# Patient Record
Sex: MALE | Race: WHITE | NOT HISPANIC OR LATINO | Employment: OTHER | URBAN - METROPOLITAN AREA
[De-identification: names, ages, dates, MRNs, and addresses within clinical notes are randomized per-mention and may not be internally consistent; named-entity substitution may affect disease eponyms.]

---

## 2022-01-31 ENCOUNTER — SPECIALTY PHARMACY (OUTPATIENT)
Dept: PHARMACY | Facility: CLINIC | Age: 67
End: 2022-01-31

## 2022-02-04 ENCOUNTER — SPECIALTY PHARMACY (OUTPATIENT)
Dept: PHARMACY | Facility: CLINIC | Age: 67
End: 2022-02-04

## 2022-02-04 NOTE — TELEPHONE ENCOUNTER
Specialty Pharmacy - Initial Clinical Assessment    Specialty Medication Orders Linked to Encounter    Flowsheet Row Most Recent Value   Medication #1 adalimumab 80 mg/0.8 mL-40 mg/0.4 mL PnKt (Order#[id number], Rx#[id number])   Medication #2 adalimumab (HUMIRA,CF, PEN) 40 mg/0.4 mL PnKt (Order#[id number], Rx#[id number])        Subjective    Nawaf Correa Jr. is a 66 y.o. male, who is followed by the specialty pharmacy service for management and education.    Recent Encounters     Date Type Provider Description    02/04/2022 Specialty Pharmacy Pooja Saeed PharmD Initial Clinical Assessment    01/31/2022 Specialty Pharmacy Pooja Saeed PharmD Referral Authorization        Clinical call attempts since last clinical assessment   No call attempts found.     Today he received education before his first fill with [facility].    Current Outpatient Medications   Medication Sig    adalimumab 80 mg/0.8 mL-40 mg/0.4 mL PnKt Inject 80 mg pen into the skin on day 1, then inject 40 mg into the skin every 14 days starting on day 8    adalimumab (HUMIRA,CF, PEN) 40 mg/0.4 mL PnKt Starting day 8 inject 40mg SC every 2 wks    bisoprolol-hydrochlorothiazide 5-6.25 mg (ZIAC) 5-6.25 mg Tab Take 1 tablet by mouth once daily.    desoximetasone (TOPICORT) 0.25 % cream APPLY TO THE AFFECTED AREA(S) a SMALL AMOUNT AS DIRECTED    desoximetasone (TOPICORT) 0.25 % cream once a day as needed    diltiaZEM (CARDIZEM CD) 300 MG 24 hr capsule Take 300 mg by mouth once daily.    glimepiride (AMARYL) 2 MG tablet Take 2 mg by mouth before breakfast.    indomethacin (INDOCIN) 50 MG capsule Take 50 mg by mouth 2 (two) times daily with meals.    lisinopriL (PRINIVIL,ZESTRIL) 2.5 MG tablet Take 2.5 mg by mouth once daily.    metFORMIN (GLUCOPHAGE) 500 MG tablet Take 500 mg by mouth 2 (two) times daily with meals.    montelukast (SINGULAIR) 10 mg tablet Take 10 mg by mouth once daily.    rosuvastatin (CRESTOR) 10 MG tablet  Take 10 mg by mouth once daily.    triamcinolone acetonide 0.5% (KENALOG) 0.5 % Crea aaa bid x 2-3 wks, take 1 wk off and repeat for psoriasis   Last reviewed on 1/10/2022 10:43 AM by Zoraida Velez MA    Review of patient's allergies indicates:  No Known AllergiesLast reviewed on  1/31/2022 11:18 AM by Victor Manuel Wolf    Drug Interactions    Drug interactions evaluated: yes  Clinically relevant drug interactions identified: no  Provided the patient with educational material regarding drug interactions: not applicable         Adverse Effects    Pruritus: Pos  Rash: Pos       Assessment Questions - Documented Responses    Flowsheet Row Most Recent Value   Assessment    Medication Reconciliation completed for patient Yes   During the past 4 weeks, has patient missed any activities due to condition or medication? No   During the past 4 weeks, did patient have any of the following urgent care visits? None   Goals of Therapy Status Discussed (new start)   Welcome packet contents reviewed and discussed with patient? Yes   Assesment completed? Yes   Plan Therapy being initiated   Do you need to open a clinical intervention (i-vent)? No   Do you want to schedule first shipment? Yes   Medication #1 Assessment Info    Patient status New medication, New to [facility]   Is this medication appropriate for the patient? Yes   Is this medication effective? Not yet started        Refill Questions - Documented Responses    Flowsheet Row Most Recent Value   Patient Availability and HIPAA Verification    Does patient want to proceed with activity? Yes   HIPAA/medical authority confirmed? Yes   Relationship to patient of person spoken to? Self   Refill Screening Questions    When does the patient need to receive the medication? 02/08/22   Refill Delivery Questions    How will the patient receive the medication? Mail   When does the patient need to receive the medication? 02/08/22   Shipping Address Home   Address in [facility]  confirmed and updated if neccessary? Yes   Expected Copay ($) 9.85   Is the patient able to afford the medication copay? Yes   Payment Method invoice (approval required)   Days supply of Refill 28   Supplies needed? No supplies needed   Refill activity completed? Yes   Refill activity plan Refill scheduled   Shipment/Pickup Date: 02/07/22          Objective    He has a past medical history of Cholesterol calculus of gallbladder and High blood pressure.    BP Readings from Last 4 Encounters:   No data found for BP     Ht Readings from Last 4 Encounters:   No data found for Ht     Wt Readings from Last 4 Encounters:   01/10/22 122.6 kg (270 lb 2.8 oz)     Recent Labs   Lab Result Units 01/10/22  1107   Creatinine mg/dL 1.1   ALT U/L 15   AST U/L 14   Hepatitis B Surface Ag  Negative     The goals of Psoriasis treatment include:  · Reducing the size, thickness and extent of lesions and erythema  · Relieving the symptoms of psoriasis  · Improving and maintaining physical function  · Preventing infection and other complications of treatment  · Reducing long term complications of psoriasis  · Minimizing psychological impact on overall well-being  · Improving or maintaining quality of life  · Maintaining optimal therapy adherence  · Minimizing and managing side effects    Goals of Therapy Status: Discussed (new start)    Assessment/Plan  Patient plans to start therapy on 02/08/22      Indication, dosage, appropriateness, effectiveness, safety and convenience of his specialty medication(s) were reviewed today.     Patient Counseling    Counseled the patient on the following: doses and administration discussed, safe handling, storage, and disposal discussed, possible adverse effects and management discussed, possible drug and prescription drug interactions discussed, possible drug and OTC drug and food interactions discussed, lab monitoring and follow-up discussed, therapeutic rationale discussed, cost of medications and  cost implications discussed, adherence and missed doses discussed, pharmacy contact information discussed       Humira initial consult. Pt plans on starting Humira on 2/8 as soon as he receives it. Pt declined Humira nurse ambassador number. Pt's son has bene on Humira for years and will be present for pt's 1st dose to help if needed. Pt feels comfortable with self-injections. Pt's psoriasis is severe and is located on his face, scalp, back, chest, abdomen, legs, and buttocks. Medication list was verified against Epic and is current. Pt is eager to start due to severity of his psoriasis. He feels comfortable with self-injections.   Infection precautions reviewed with pt. Counseled pt to hold dose if ill. Dosage, storage, and administration reviewed with pt. Informed pt Humira is stable at room temperature for 14 days. How to handle missed doses reviewed. Advised pt to use a calendar to keep track of injection days. Injection sites reviewed with pt, as well as rotating injection sites. Injection training reviewed with pt. Pt voiced understanding. Common side effects reviewed with pt. Serious side effects to report to MD reviewed with pt. Warnings/precautions discussed - avoiding live vaccines, lupus-like syndrome, malignancy/lymphoma, risk of serious infection, signs/symptoms of liver abnormalities. Reiterated importance of keeping lab appts. Pt verbalized understanding. [facility] refill process and services explained to pt. [facility] will call in 3 weeks for refill. Pt had no further questions or concerns and was encouraged to call [facility] should any arise.  Tasks added this encounter   3/1/2022 - Refill Call (Auto Added)  4/28/2022 - Clinical - Follow Up Assesement (90 day)   Tasks due within next 3 months   No tasks due.     Pooja Saeed, PharmD  [facility]  [address] [facility] [address]  Phone: [phone]  Fax: [phone]

## 2022-03-03 ENCOUNTER — SPECIALTY PHARMACY (OUTPATIENT)
Dept: PHARMACY | Facility: CLINIC | Age: 67
End: 2022-03-03

## 2022-03-03 NOTE — TELEPHONE ENCOUNTER
Specialty Pharmacy - Refill Coordination    Specialty Medication Orders Linked to Encounter    Flowsheet Row Most Recent Value   Medication #1 adalimumab (HUMIRA,CF, PEN) 40 mg/0.4 mL PnKt (Order#[id number], Rx#[id number])          Refill Questions - Documented Responses    Flowsheet Row Most Recent Value   Patient Availability and HIPAA Verification    Does patient want to proceed with activity? Yes   HIPAA/medical authority confirmed? Yes   Relationship to patient of person spoken to? Self   Refill Screening Questions    Changes to allergies? No   Changes to medications? No   New conditions since last clinic visit? No   Unplanned office visit, urgent care, ED, or hospital admission in the last 4 weeks? No   How does patient/caregiver feel medication is working? Very good   Financial problems or insurance changes? No   How many doses of your specialty medications were missed in the last 4 weeks? 0   Would patient like to speak to a pharmacist? No   When does the patient need to receive the medication? 03/15/22   Refill Delivery Questions    How will the patient receive the medication? Mail   When does the patient need to receive the medication? 03/15/22   Shipping Address Home   Address in [facility] confirmed and updated if neccessary? Yes   Expected Copay ($) 0   Is the patient able to afford the medication copay? Yes   Payment Method zero copay   Days supply of Refill 28   Supplies needed? No supplies needed   Refill activity completed? Yes   Refill activity plan Refill scheduled   Shipment/Pickup Date: 03/08/22          Current Outpatient Medications   Medication Sig    adalimumab (HUMIRA,CF, PEN) 40 mg/0.4 mL PnKt Inject 0.4 mLs (40 mg total) into the skin every 14 (fourteen) days.    bisoprolol-hydrochlorothiazide 5-6.25 mg (ZIAC) 5-6.25 mg Tab Take 1 tablet by mouth once daily.    desoximetasone (TOPICORT) 0.25 % cream APPLY TO THE AFFECTED AREA(S) a SMALL AMOUNT AS DIRECTED    desoximetasone  (TOPICORT) 0.25 % cream once a day as needed    diltiaZEM (CARDIZEM CD) 300 MG 24 hr capsule Take 300 mg by mouth once daily.    glimepiride (AMARYL) 2 MG tablet Take 2 mg by mouth before breakfast.    indomethacin (INDOCIN) 50 MG capsule Take 50 mg by mouth 2 (two) times daily with meals.    lisinopriL (PRINIVIL,ZESTRIL) 2.5 MG tablet Take 2.5 mg by mouth once daily.    metFORMIN (GLUCOPHAGE) 500 MG tablet Take 500 mg by mouth 2 (two) times daily with meals.    montelukast (SINGULAIR) 10 mg tablet Take 10 mg by mouth once daily.    rosuvastatin (CRESTOR) 10 MG tablet Take 10 mg by mouth once daily.    triamcinolone acetonide 0.5% (KENALOG) 0.5 % Crea aaa bid x 2-3 wks, take 1 wk off and repeat for psoriasis   Last reviewed on 1/10/2022 10:43 AM by Zoraida Vleez MA    Review of patient's allergies indicates:  No Known Allergies Last reviewed on  1/31/2022 11:18 AM by Victor Manuel Wolf      Tasks added this encounter   4/5/2022 - Refill Call (Auto Added)   Tasks due within next 3 months   4/28/2022 - Clinical - Follow Up Assesement (90 day)     Rolly Ponce, PharmD  [facility]  [address] [place] [address]  Phone: [phone]  Fax: [phone]

## 2022-04-05 ENCOUNTER — SPECIALTY PHARMACY (OUTPATIENT)
Dept: PHARMACY | Facility: CLINIC | Age: 67
End: 2022-04-05

## 2022-04-05 NOTE — TELEPHONE ENCOUNTER
Specialty Pharmacy - Refill Coordination    Specialty Medication Orders Linked to Encounter    Flowsheet Row Most Recent Value   Medication #1 adalimumab (HUMIRA,CF, PEN) 40 mg/0.4 mL PnKt (Order#[id number], Rx#[id number])          Refill Questions - Documented Responses    Flowsheet Row Most Recent Value   Patient Availability and HIPAA Verification    Does patient want to proceed with activity? Yes   HIPAA/medical authority confirmed? Yes   Relationship to patient of person spoken to? Self   Refill Screening Questions    Changes to allergies? No   Changes to medications? No   New conditions since last clinic visit? No   Unplanned office visit, urgent care, ED, or hospital admission in the last 4 weeks? No   How does patient/caregiver feel medication is working? Very good   Financial problems or insurance changes? No   How many doses of your specialty medications were missed in the last 4 weeks? 0   Would patient like to speak to a pharmacist? No   When does the patient need to receive the medication? 04/12/22   Refill Delivery Questions    How will the patient receive the medication? Mail   When does the patient need to receive the medication? 04/12/22   Shipping Address Home   Address in [facility] confirmed and updated if neccessary? Yes   Expected Copay ($) 0   Is the patient able to afford the medication copay? Yes   Payment Method zero copay   Days supply of Refill 28   Supplies needed? No supplies needed   Refill activity completed? Yes   Refill activity plan Refill scheduled   Shipment/Pickup Date: 04/06/22          Current Outpatient Medications   Medication Sig    adalimumab (HUMIRA,CF, PEN) 40 mg/0.4 mL PnKt Inject 0.4 mLs (40 mg total) into the skin every 14 (fourteen) days.    bisoprolol-hydrochlorothiazide 5-6.25 mg (ZIAC) 5-6.25 mg Tab Take 1 tablet by mouth once daily.    desoximetasone (TOPICORT) 0.25 % cream APPLY TO THE AFFECTED AREA(S) a SMALL AMOUNT AS DIRECTED    desoximetasone  (TOPICORT) 0.25 % cream once a day as needed    diltiaZEM (CARDIZEM CD) 300 MG 24 hr capsule Take 300 mg by mouth once daily.    glimepiride (AMARYL) 2 MG tablet Take 2 mg by mouth before breakfast.    indomethacin (INDOCIN) 50 MG capsule Take 50 mg by mouth 2 (two) times daily with meals.    lisinopriL (PRINIVIL,ZESTRIL) 2.5 MG tablet Take 2.5 mg by mouth once daily.    metFORMIN (GLUCOPHAGE) 500 MG tablet Take 500 mg by mouth 2 (two) times daily with meals.    montelukast (SINGULAIR) 10 mg tablet Take 10 mg by mouth once daily.    rosuvastatin (CRESTOR) 10 MG tablet Take 10 mg by mouth once daily.    triamcinolone acetonide 0.5% (KENALOG) 0.5 % Crea aaa bid x 2-3 wks, take 1 wk off and repeat for psoriasis   Last reviewed on 1/10/2022 10:43 AM by Zoraida Velez MA    Review of patient's allergies indicates:  No Known Allergies Last reviewed on  1/31/2022 11:18 AM by Victor Manuel Wolf      Tasks added this encounter   5/3/2022 - Refill Call (Auto Added)   Tasks due within next 3 months   4/28/2022 - Clinical - Follow Up Assesement (90 day)     Pooja Saeed, PharmD  [facility]  [address] [facility]  [facility] [address]  Phone: [phone]  Fax: [phone]

## 2022-04-27 ENCOUNTER — SPECIALTY PHARMACY (OUTPATIENT)
Dept: PHARMACY | Facility: CLINIC | Age: 67
End: 2022-04-27

## 2022-04-27 NOTE — TELEPHONE ENCOUNTER
Incoming call from patient reporting shortness of breath and swelling in the knees, legs, and feet. Patient reports that these symptoms started about a week ago and inquired if they are related to Humira, which was initiated in February.    Advised patient that these symptoms may be associated with Humira. Patient denies any previous cardiovascular issues. Patient has hypertension and denies any previous SOB. Patient reports that last Humira dose was on 4/26, next dose is due on 5/10. Advised patient to hold Humira until he is evaluated by a provider. Messaged derm provider to inform and evaluate next steps in patient's treatment.    no

## 2022-05-03 NOTE — TELEPHONE ENCOUNTER
Incoming call from patient's wife reporting that patient is currently hospitalized for pneumonia since 4/28. Advised patient to continue holding Humira until infection is resolved and antibiotics are completed. Will follow up with patient next week to assess infection status and whether Humira is appropriate to continue. Patient's wife verbalized understanding. Messaged provider and staff for further guidance.

## 2022-05-09 NOTE — TELEPHONE ENCOUNTER
Per Dr. Wolf via secure chat, Humira may be DC'd until patient is seen in clinic and evaluated. Called patient to inform. No answer, LVM. Dis-enrolling patient from [facility] at this time.

## 2022-06-08 ENCOUNTER — SPECIALTY PHARMACY (OUTPATIENT)
Dept: PHARMACY | Facility: CLINIC | Age: 67
End: 2022-06-08

## 2022-06-08 NOTE — TELEPHONE ENCOUNTER
Specialty Pharmacy - Clinical Intervention  Specialty Pharmacy - Refill Coordination    Specialty Medication Orders Linked to Encounter    Flowsheet Row Most Recent Value   Medication #1 adalimumab (HUMIRA,CF, PEN) 40 mg/0.4 mL PnKt (Order#[id number], Rx#[id number])          Refill Questions - Documented Responses    Flowsheet Row Most Recent Value   Patient Availability and HIPAA Verification    Does patient want to proceed with activity? Yes   HIPAA/medical authority confirmed? Yes   Relationship to patient of person spoken to? Self   Refill Screening Questions    Changes to allergies? No   Changes to medications? No   New conditions since last clinic visit? No   Unplanned office visit, urgent care, ED, or hospital admission in the last 4 weeks? Yes  [hospital admission for pneumonia. See i-vent.]   How does patient/caregiver feel medication is working? Good   Financial problems or insurance changes? No   How many doses of your specialty medications were missed in the last 4 weeks? 3   Why were doses missed? Felt ill or sick   Would patient like to speak to a pharmacist? Yes   When does the patient need to receive the medication? 06/10/22   Refill Delivery Questions    How will the patient receive the medication? Mail   When does the patient need to receive the medication? 06/10/22   Shipping Address Home   Address in [facility] confirmed and updated if neccessary? Yes   Expected Copay ($) 0   Is the patient able to afford the medication copay? Yes   Payment Method zero copay   Days supply of Refill 28   Supplies needed? Alcohol Swabs   Refill activity completed? Yes   Refill activity plan Refill scheduled   Shipment/Pickup Date: 06/09/22          Current Outpatient Medications   Medication Sig    adalimumab (HUMIRA,CF, PEN) 40 mg/0.4 mL PnKt Inject 0.4 mLs (40 mg total) into the skin every 14 (fourteen) days.    bisoprolol-hydrochlorothiazide 5-6.25 mg (ZIAC) 5-6.25 mg Tab Take 1 tablet by mouth once  daily.    desoximetasone (TOPICORT) 0.25 % cream APPLY TO THE AFFECTED AREA(S) a SMALL AMOUNT AS DIRECTED    desoximetasone (TOPICORT) 0.25 % cream once a day as needed    diltiaZEM (CARDIZEM CD) 300 MG 24 hr capsule Take 300 mg by mouth once daily.    glimepiride (AMARYL) 2 MG tablet Take 2 mg by mouth before breakfast.    indomethacin (INDOCIN) 50 MG capsule Take 50 mg by mouth 2 (two) times daily with meals.    lisinopriL (PRINIVIL,ZESTRIL) 2.5 MG tablet Take 2.5 mg by mouth once daily.    metFORMIN (GLUCOPHAGE) 500 MG tablet Take 500 mg by mouth 2 (two) times daily with meals.    montelukast (SINGULAIR) 10 mg tablet Take 10 mg by mouth once daily.    rosuvastatin (CRESTOR) 10 MG tablet Take 10 mg by mouth once daily.    triamcinolone acetonide 0.5% (KENALOG) 0.5 % Crea aaa bid x 2-3 wks, take 1 wk off and repeat for psoriasis   Last reviewed on 1/10/2022 10:43 AM by Zoraida Velez MA    Review of patient's allergies indicates:  No Known Allergies Last reviewed on  1/31/2022 11:18 AM by Victor Manuel Wolf      Tasks added this encounter   7/1/2022 - Refill Call (Auto Added)   Tasks due within next 3 months   No tasks due.     Angel Rosas, PharmD  [facility]  [address] [place] [address]  Phone: [phone]  Fax: [phone]

## 2022-06-08 NOTE — TELEPHONE ENCOUNTER
Message received from [facility] stating that patient has been discharged from the hospital and is doing well. Patient has been provided education on signs of fluid overload. Provider would like for patient to restart Humira to prevent psoriasis flare. Requested a new prescription for Humira. DONALD Brandon stated that Dr. Wolf will be back in office on Monday 6/13 and will authorize refill request at that time. Will reach out to patient once received.

## 2022-07-01 ENCOUNTER — SPECIALTY PHARMACY (OUTPATIENT)
Dept: PHARMACY | Facility: CLINIC | Age: 67
End: 2022-07-01

## 2022-07-01 NOTE — TELEPHONE ENCOUNTER
Specialty Pharmacy - Refill Coordination    Specialty Medication Orders Linked to Encounter    Flowsheet Row Most Recent Value   Medication #1 adalimumab (HUMIRA,CF, PEN) 40 mg/0.4 mL PnKt (Order#[id number], Rx#[id number])          Refill Questions - Documented Responses    Flowsheet Row Most Recent Value   Patient Availability and HIPAA Verification    Does patient want to proceed with activity? Yes   HIPAA/medical authority confirmed? Yes   Relationship to patient of person spoken to? Self   Refill Screening Questions    Changes to allergies? No   Changes to medications? No   New conditions since last clinic visit? No   Unplanned office visit, urgent care, ED, or hospital admission in the last 4 weeks? No   How does patient/caregiver feel medication is working? Good   Financial problems or insurance changes? No   How many doses of your specialty medications were missed in the last 4 weeks? 0   Would patient like to speak to a pharmacist? No   When does the patient need to receive the medication? 07/09/22   Refill Delivery Questions    How will the patient receive the medication? Mail   When does the patient need to receive the medication? 07/09/22   Shipping Address Home   Address in [facility] confirmed and updated if neccessary? Yes   Expected Copay ($) 0   Is the patient able to afford the medication copay? Yes   Payment Method zero copay   Days supply of Refill 28   Supplies needed? No supplies needed   Refill activity completed? Yes   Refill activity plan Refill scheduled   Shipment/Pickup Date: 07/06/22          Current Outpatient Medications   Medication Sig    adalimumab (HUMIRA,CF, PEN) 40 mg/0.4 mL PnKt Inject 0.4 mLs (40 mg total) into the skin every 14 (fourteen) days.    bisoprolol-hydrochlorothiazide 5-6.25 mg (ZIAC) 5-6.25 mg Tab Take 1 tablet by mouth once daily.    desoximetasone (TOPICORT) 0.25 % cream APPLY TO THE AFFECTED AREA(S) a SMALL AMOUNT AS DIRECTED    desoximetasone  (TOPICORT) 0.25 % cream once a day as needed    diltiaZEM (CARDIZEM CD) 300 MG 24 hr capsule Take 300 mg by mouth once daily.    glimepiride (AMARYL) 2 MG tablet Take 2 mg by mouth before breakfast.    indomethacin (INDOCIN) 50 MG capsule Take 50 mg by mouth 2 (two) times daily with meals.    lisinopriL (PRINIVIL,ZESTRIL) 2.5 MG tablet Take 2.5 mg by mouth once daily.    metFORMIN (GLUCOPHAGE) 500 MG tablet Take 500 mg by mouth 2 (two) times daily with meals.    montelukast (SINGULAIR) 10 mg tablet Take 10 mg by mouth once daily.    rosuvastatin (CRESTOR) 10 MG tablet Take 10 mg by mouth once daily.    triamcinolone acetonide 0.5% (KENALOG) 0.5 % Crea aaa bid x 2-3 wks, take 1 wk off and repeat for psoriasis   Last reviewed on 1/10/2022 10:43 AM by Zoraida Velez MA    Review of patient's allergies indicates:  No Known Allergies Last reviewed on  6/13/2022 6:27 PM by Victor Manuel Wolf      Tasks added this encounter   No tasks added.   Tasks due within next 3 months   7/1/2022 - Refill Call (Auto Added)     [facility]  [address] [place] [id number]  Phone: [phone]  Fax: [phone]

## 2022-08-01 ENCOUNTER — SPECIALTY PHARMACY (OUTPATIENT)
Dept: PHARMACY | Facility: CLINIC | Age: 67
End: 2022-08-01

## 2022-08-01 NOTE — TELEPHONE ENCOUNTER
Specialty Pharmacy - Refill Coordination    Specialty Medication Orders Linked to Encounter    Flowsheet Row Most Recent Value   Medication #1 adalimumab (HUMIRA,CF, PEN) 40 mg/0.4 mL PnKt (Order#[id number], Rx#[id number])          Refill Questions - Documented Responses    Flowsheet Row Most Recent Value   Patient Availability and HIPAA Verification    Does patient want to proceed with activity? Yes   HIPAA/medical authority confirmed? Yes   Relationship to patient of person spoken to? Self   Refill Screening Questions    Changes to allergies? No   Changes to medications? No   New conditions since last clinic visit? No   Unplanned office visit, urgent care, ED, or hospital admission in the last 4 weeks? No   How does patient/caregiver feel medication is working? Good   Financial problems or insurance changes? No   How many doses of your specialty medications were missed in the last 4 weeks? 0   Would patient like to speak to a pharmacist? No   When does the patient need to receive the medication? 08/06/22   Refill Delivery Questions    How will the patient receive the medication? Mail   When does the patient need to receive the medication? 08/06/22   Shipping Address Home   Address in [facility] confirmed and updated if neccessary? Yes   Expected Copay ($) 0   Is the patient able to afford the medication copay? Yes   Payment Method zero copay   Days supply of Refill 28   Supplies needed? No supplies needed   Refill activity completed? Yes   Refill activity plan Refill scheduled   Shipment/Pickup Date: 08/03/22          Current Outpatient Medications   Medication Sig    adalimumab (HUMIRA,CF, PEN) 40 mg/0.4 mL PnKt Inject 0.4 mLs (40 mg total) into the skin every 14 (fourteen) days.    bisoprolol-hydrochlorothiazide 5-6.25 mg (ZIAC) 5-6.25 mg Tab Take 1 tablet by mouth once daily.    desoximetasone (TOPICORT) 0.25 % cream APPLY TO THE AFFECTED AREA(S) a SMALL AMOUNT AS DIRECTED    desoximetasone  (TOPICORT) 0.25 % cream once a day as needed    diltiaZEM (CARDIZEM CD) 300 MG 24 hr capsule Take 300 mg by mouth once daily.    glimepiride (AMARYL) 2 MG tablet Take 2 mg by mouth before breakfast.    indomethacin (INDOCIN) 50 MG capsule Take 50 mg by mouth 2 (two) times daily with meals.    lisinopriL (PRINIVIL,ZESTRIL) 2.5 MG tablet Take 2.5 mg by mouth once daily.    metFORMIN (GLUCOPHAGE) 500 MG tablet Take 500 mg by mouth 2 (two) times daily with meals.    montelukast (SINGULAIR) 10 mg tablet Take 10 mg by mouth once daily.    rosuvastatin (CRESTOR) 10 MG tablet Take 10 mg by mouth once daily.    triamcinolone acetonide 0.1% (KENALOG) 0.1 % cream aaa bid-tid x 2-3 wks, tk 1 wk off before repeating   Last reviewed on 8/1/2022 10:53 AM by Zoraida Velez MA    Review of patient's allergies indicates:  No Known Allergies Last reviewed on  8/1/2022 10:52 AM by Zoraida Velez      Tasks added this encounter   8/27/2022 - Refill Call (Auto Added)   Tasks due within next 3 months   No tasks due.     Viji العلي, Patient Care Assistant  [facility]  [address] [place], [place] [address] [address]  [facility] [address]  Phone: [phone]  Fax: [phone]

## 2022-08-29 ENCOUNTER — SPECIALTY PHARMACY (OUTPATIENT)
Dept: PHARMACY | Facility: CLINIC | Age: 67
End: 2022-08-29

## 2022-08-29 NOTE — TELEPHONE ENCOUNTER
Specialty Pharmacy - Refill Coordination    Specialty Medication Orders Linked to Encounter      Flowsheet Row Most Recent Value   Medication #1 adalimumab (HUMIRA,CF, PEN) 40 mg/0.4 mL PnKt (Order#[id number], Rx#[id number])            Refill Questions - Documented Responses      Flowsheet Row Most Recent Value   Patient Availability and HIPAA Verification    Does patient want to proceed with activity? Yes   HIPAA/medical authority confirmed? Yes   Relationship to patient of person spoken to? Self   Refill Screening Questions    Changes to allergies? No   Changes to medications? No   New conditions since last clinic visit? No   Unplanned office visit, urgent care, ED, or hospital admission in the last 4 weeks? No   How does patient/caregiver feel medication is working? Very good   Financial problems or insurance changes? No   How many doses of your specialty medications were missed in the last 4 weeks? 0   Would patient like to speak to a pharmacist? No   When does the patient need to receive the medication? 09/01/22   Refill Delivery Questions    How will the patient receive the medication? Mail   When does the patient need to receive the medication? 09/01/22   Shipping Address Home   Address in [facility] confirmed and updated if neccessary? Yes   Expected Copay ($) 0   Is the patient able to afford the medication copay? Yes   Payment Method zero copay   Days supply of Refill 28   Supplies needed? No supplies needed   Refill activity completed? Yes   Refill activity plan Refill scheduled   Shipment/Pickup Date: 08/31/22            Current Outpatient Medications   Medication Sig    adalimumab (HUMIRA,CF, PEN) 40 mg/0.4 mL PnKt Inject 0.4 mLs (40 mg total) into the skin every 14 (fourteen) days.    bisoprolol-hydrochlorothiazide 5-6.25 mg (ZIAC) 5-6.25 mg Tab Take 1 tablet by mouth once daily.    desoximetasone (TOPICORT) 0.25 % cream APPLY TO THE AFFECTED AREA(S) a SMALL AMOUNT AS DIRECTED    desoximetasone  (TOPICORT) 0.25 % cream once a day as needed    diltiaZEM (CARDIZEM CD) 300 MG 24 hr capsule Take 300 mg by mouth once daily.    glimepiride (AMARYL) 2 MG tablet Take 2 mg by mouth before breakfast.    indomethacin (INDOCIN) 50 MG capsule Take 50 mg by mouth 2 (two) times daily with meals.    lisinopriL (PRINIVIL,ZESTRIL) 2.5 MG tablet Take 2.5 mg by mouth once daily.    metFORMIN (GLUCOPHAGE) 500 MG tablet Take 500 mg by mouth 2 (two) times daily with meals.    montelukast (SINGULAIR) 10 mg tablet Take 10 mg by mouth once daily.    rosuvastatin (CRESTOR) 10 MG tablet Take 10 mg by mouth once daily.    triamcinolone acetonide 0.1% (KENALOG) 0.1 % cream aaa bid-tid x 2-3 wks, tk 1 wk off before repeating   Last reviewed on 8/1/2022 10:53 AM by Zoraida Velez MA    Review of patient's allergies indicates:  No Known Allergies Last reviewed on  8/1/2022 10:52 AM by Zoraida Velez      Tasks added this encounter   9/22/2022 - Refill Call (Auto Added)   Tasks due within next 3 months   11/4/2022 - Clinical - Follow Up Assesement (Annual)     Phyllis Layton, PharmD  [facility]  [facility] [address]  [facility] [address]  Phone: [phone]  Fax: [phone]

## 2022-09-22 ENCOUNTER — SPECIALTY PHARMACY (OUTPATIENT)
Dept: PHARMACY | Facility: CLINIC | Age: 67
End: 2022-09-22

## 2022-09-22 NOTE — TELEPHONE ENCOUNTER
Specialty Pharmacy - Refill Coordination    Specialty Medication Orders Linked to Encounter      Flowsheet Row Most Recent Value   Medication #1 adalimumab (HUMIRA,CF, PEN) 40 mg/0.4 mL PnKt (Order#[id number], Rx#[id number])            Refill Questions - Documented Responses      Flowsheet Row Most Recent Value   Patient Availability and HIPAA Verification    Does patient want to proceed with activity? Yes   HIPAA/medical authority confirmed? Yes   Relationship to patient of person spoken to? Self   Refill Screening Questions    Changes to allergies? No   Changes to medications? No   New conditions since last clinic visit? No   Unplanned office visit, urgent care, ED, or hospital admission in the last 4 weeks? No   How does patient/caregiver feel medication is working? Good   Financial problems or insurance changes? No   How many doses of your specialty medications were missed in the last 4 weeks? 0   Would patient like to speak to a pharmacist? No   When does the patient need to receive the medication? 10/01/22   Refill Delivery Questions    How will the patient receive the medication? Mail   When does the patient need to receive the medication? 10/01/22   Shipping Address Home   Address in [facility] confirmed and updated if neccessary? Yes   Expected Copay ($) 0   Is the patient able to afford the medication copay? Yes   Payment Method zero copay   Days supply of Refill 28   Supplies needed? No supplies needed   Refill activity completed? Yes   Refill activity plan Refill scheduled   Shipment/Pickup Date: 09/28/22            Current Outpatient Medications   Medication Sig    adalimumab (HUMIRA,CF, PEN) 40 mg/0.4 mL PnKt Inject 0.4 mLs (40 mg total) into the skin every 14 (fourteen) days.    bisoprolol-hydrochlorothiazide 5-6.25 mg (ZIAC) 5-6.25 mg Tab Take 1 tablet by mouth once daily.    desoximetasone (TOPICORT) 0.25 % cream APPLY TO THE AFFECTED AREA(S) a SMALL AMOUNT AS DIRECTED    desoximetasone  (TOPICORT) 0.25 % cream once a day as needed    diltiaZEM (CARDIZEM CD) 300 MG 24 hr capsule Take 300 mg by mouth once daily.    glimepiride (AMARYL) 2 MG tablet Take 2 mg by mouth before breakfast.    indomethacin (INDOCIN) 50 MG capsule Take 50 mg by mouth 2 (two) times daily with meals.    lisinopriL (PRINIVIL,ZESTRIL) 2.5 MG tablet Take 2.5 mg by mouth once daily.    metFORMIN (GLUCOPHAGE) 500 MG tablet Take 500 mg by mouth 2 (two) times daily with meals.    montelukast (SINGULAIR) 10 mg tablet Take 10 mg by mouth once daily.    rosuvastatin (CRESTOR) 10 MG tablet Take 10 mg by mouth once daily.    triamcinolone acetonide 0.1% (KENALOG) 0.1 % cream aaa bid-tid x 2-3 wks, tk 1 wk off before repeating   Last reviewed on 8/1/2022 10:53 AM by Zoraida Velez MA    Review of patient's allergies indicates:  No Known Allergies Last reviewed on  8/1/2022 10:52 AM by Zoraida Velez      Tasks added this encounter   10/22/2022 - Refill Call (Auto Added)   Tasks due within next 3 months   11/4/2022 - Clinical - Follow Up Assesement (Annual)     [facility]  [address] [place], [place] [address]  [facility] [address]  Phone: [phone]  Fax: [phone]

## 2022-10-19 ENCOUNTER — SPECIALTY PHARMACY (OUTPATIENT)
Dept: PHARMACY | Facility: CLINIC | Age: 67
End: 2022-10-19

## 2022-10-19 NOTE — TELEPHONE ENCOUNTER
New Rxs received for Cosentyx; changing therapy from Humira. PA required. Messaged provider to clarify sig for maintenance dose.

## 2022-10-24 ENCOUNTER — SPECIALTY PHARMACY (OUTPATIENT)
Dept: PHARMACY | Facility: CLINIC | Age: 67
End: 2022-10-24

## 2022-10-24 NOTE — TELEPHONE ENCOUNTER
Specialty Pharmacy - Refill Coordination    Specialty Medication Orders Linked to Encounter      Flowsheet Row Most Recent Value   Medication #1 adalimumab (HUMIRA,CF, PEN) 40 mg/0.4 mL PnKt (Order#[id number], Rx#[id number])            Refill Questions - Documented Responses      Flowsheet Row Most Recent Value   Patient Availability and HIPAA Verification    Does patient want to proceed with activity? Yes   HIPAA/medical authority confirmed? Yes   Relationship to patient of person spoken to? Self   Refill Screening Questions    Changes to allergies? No   Changes to medications? No   New conditions since last clinic visit? No   Unplanned office visit, urgent care, ED, or hospital admission in the last 4 weeks? No   How does patient/caregiver feel medication is working? Good   Financial problems or insurance changes? No   How many doses of your specialty medications were missed in the last 4 weeks? 0   Would patient like to speak to a pharmacist? No   When does the patient need to receive the medication? 10/29/22   Refill Delivery Questions    How will the patient receive the medication? Mail   When does the patient need to receive the medication? 10/29/22   Shipping Address Home   Address in [facility] confirmed and updated if neccessary? Yes   Expected Copay ($) 0   Is the patient able to afford the medication copay? Yes   Payment Method zero copay   Days supply of Refill 28   Supplies needed? No supplies needed   Refill activity completed? Yes   Refill activity plan Refill scheduled   Shipment/Pickup Date: 10/26/22            Current Outpatient Medications   Medication Sig    adalimumab (HUMIRA,CF, PEN) 40 mg/0.4 mL PnKt Inject 0.4 mLs (40 mg total) into the skin every 14 (fourteen) days.    bisoprolol-hydrochlorothiazide 5-6.25 mg (ZIAC) 5-6.25 mg Tab Take 1 tablet by mouth once daily.    desoximetasone (TOPICORT) 0.25 % cream APPLY TO THE AFFECTED AREA(S) a SMALL AMOUNT AS DIRECTED    desoximetasone  (TOPICORT) 0.25 % cream once a day as needed    diltiaZEM (CARDIZEM CD) 300 MG 24 hr capsule Take 300 mg by mouth once daily.    glimepiride (AMARYL) 2 MG tablet Take 2 mg by mouth before breakfast.    indomethacin (INDOCIN) 50 MG capsule Take 50 mg by mouth 2 (two) times daily with meals.    lisinopriL (PRINIVIL,ZESTRIL) 2.5 MG tablet Take 2.5 mg by mouth once daily.    metFORMIN (GLUCOPHAGE) 500 MG tablet Take 500 mg by mouth 2 (two) times daily with meals.    montelukast (SINGULAIR) 10 mg tablet Take 10 mg by mouth once daily.    rosuvastatin (CRESTOR) 10 MG tablet Take 10 mg by mouth once daily.    secukinumab (COSENTYX PEN, 2 PENS,) 150 mg/mL PnIj Inject 300mg SQ qweek x 5 weeks then inject 300mg SQ q 4 weeks    secukinumab (COSENTYX PEN, 2 PENS,) 150 mg/mL PnIj Starting week 9, inject 300mg SC qweek    triamcinolone acetonide 0.1% (KENALOG) 0.1 % cream aaa bid-tid x 2-3 wks, tk 1 wk off before repeating    triamcinolone acetonide 0.5% (KENALOG) 0.5 % Crea Aaa bid x 2-3 wks, tk 1 wk off then repeat   Last reviewed on 10/17/2022 10:42 AM by Zoraida Velez MA    Review of patient's allergies indicates:  No Known Allergies Last reviewed on  10/17/2022 10:42 AM by Zoraida Velez      Tasks added this encounter   11/19/2022 - Refill Call (Auto Added)   Tasks due within next 3 months   11/4/2022 - Clinical - Follow Up Assesement (Annual)     Radha Blank, PharmD  [facility]  [address] [place], [place] [address]  [facility] [address]  Phone: [phone]  Fax: [phone]

## 2022-10-24 NOTE — TELEPHONE ENCOUNTER
PA closed stating that [facility] does not handle PA. PA re-submitted via CMM to [employer] (Key: [id number])

## 2022-10-24 NOTE — TELEPHONE ENCOUNTER
Incoming call from patient inquiring about Humira. Advised that therapy is being changed to Cosentyx per last office visit. Cosentyx is pending determination on prior authorization. Advised that I will follow up by 10/26 to advise on next steps. If Cosentyx is not approved by then, we will plan to dispense Humira. Patient voiced understanding.

## 2022-10-25 NOTE — TELEPHONE ENCOUNTER
Cosentyx denied due to being non-formulary. Preferred formulary alternatives include Taltz and Skyrizi. Messaged provider to inquire if appropriate to switch to Taltz or Skyrizi. Provider agreeable to switching to Taltz.    PA submitted for Taltz via epic.

## 2022-10-26 ENCOUNTER — SPECIALTY PHARMACY (OUTPATIENT)
Dept: PHARMACY | Facility: CLINIC | Age: 67
End: 2022-10-26

## 2022-10-26 DIAGNOSIS — L40.9 PSORIASIS: Primary | ICD-10-CM

## 2022-10-26 NOTE — TELEPHONE ENCOUNTER
Specialty Pharmacy - Initial Clinical Assessment    Specialty Medication Orders Linked to Encounter      Flowsheet Row Most Recent Value   Medication #1 ixekizumab (TALTZ AUTOINJECTOR, 3 PACK,) 80 mg/mL AtIn (Order#[id number], Rx#[id number])          Patient Diagnosis   L40.9 - Psoriasis    Subjective    Nawaf Correa Jr. is a 67 y.o. male, who is followed by the specialty pharmacy service for management and education.    Recent Encounters       Date Type Provider Description    10/26/2022 Specialty Pharmacy Angel Rosas PharmD Initial Clinical Assessment    10/24/2022 Specialty Pharmacy Radha Blank, Pankaj Refill Coordination    10/19/2022 Specialty Pharmacy Angel Rosas PharmD Referral Authorization    09/22/2022 Specialty Pharmacy Ericka Huerta Refill Coordination    08/29/2022 Specialty Pharmacy Phyllis Layton PharmD Refill Coordination          Clinical call attempts since last clinical assessment   6/8/2022  2:05 PM - Specialty Pharmacy - Clinical Intervention by Angel Rosas PharmD     Current Outpatient Medications   Medication Sig    bisoprolol-hydrochlorothiazide 5-6.25 mg (ZIAC) 5-6.25 mg Tab Take 1 tablet by mouth once daily.    diltiaZEM (CARDIZEM CD) 300 MG 24 hr capsule Take 300 mg by mouth once daily.    glimepiride (AMARYL) 2 MG tablet Take 2 mg by mouth before breakfast.    indomethacin (INDOCIN) 50 MG capsule Take 50 mg by mouth 2 (two) times daily with meals.    ixekizumab (TALTZ AUTOINJECTOR) 80 mg/mL AtIn Inject 1 pen sc every 4 weeks    ixekizumab (TALTZ AUTOINJECTOR, 2 PACK,) 80 mg/mL AtIn Inject 80 mg (1 pen) into the skin every other week from week 4 through 10    ixekizumab (TALTZ AUTOINJECTOR, 3 PACK,) 80 mg/mL AtIn Inject 160 mg (2 pens) into the skin on week 0, then inject 80 mg (1 pen) into the skin on week 2    lisinopriL (PRINIVIL,ZESTRIL) 2.5 MG tablet Take 2.5 mg by mouth once daily.    metFORMIN (GLUCOPHAGE) 500 MG tablet Take 500 mg by mouth 2 (two) times  daily with meals.    montelukast (SINGULAIR) 10 mg tablet Take 10 mg by mouth once daily.    rosuvastatin (CRESTOR) 10 MG tablet Take 10 mg by mouth once daily.    triamcinolone acetonide 0.1% (KENALOG) 0.1 % cream aaa bid-tid x 2-3 wks, tk 1 wk off before repeating    adalimumab (HUMIRA,CF, PEN) 40 mg/0.4 mL PnKt Inject 0.4 mLs (40 mg total) into the skin every 14 (fourteen) days.   Last reviewed on 10/17/2022 10:42 AM by Zoraida Velez MA    Review of patient's allergies indicates:  No Known AllergiesLast reviewed on  10/26/2022 10:13 AM by Angel Rosas    Drug Interactions    Drug interactions evaluated: no  Clinically relevant drug interactions identified: no  Provided the patient with educational material regarding drug interactions: not applicable         Adverse Effects    Pruritus: Pos  Rash: Pos  Constitution: System reviewed and is negative  Eyes: System reviewed and is negative  Endocrine and Metabolic: System reviewed and is negative  Gastrointestinal: System reviewed and is negative  Genitourinary: System reviewed and is negative  Cardiovascular: System reviewed and is negative  Hematologic: System reviewed and is negative  Musculoskeletal: System reviewed and is negative  HENT: System reviewed and is negative  Neurological: System reviewed and is negative  Psychiatric: System reviewed and is negative  Respiratory: System reviewed and is negative       Assessment Questions - Documented Responses      Flowsheet Row Most Recent Value   Assessment    Medication Reconciliation completed for patient Yes   During the past 4 weeks, has patient missed any activities due to condition or medication? No   During the past 4 weeks, did patient have any of the following urgent care visits? None   Goals of Therapy Status Discussed (new start)   Status of the patients ability to self-administer: Is Able   All education points have been covered with patient? Yes, supplemental printed education provided   Welcome  "packet contents reviewed and discussed with patient? No   Assesment completed? Yes   Plan Therapy being initiated   Do you need to open a clinical intervention (i-vent)? No   Do you want to schedule first shipment? Yes   Medication #1 Assessment Info    Patient status New medication, Exisiting to [facility]   Is this medication appropriate for the patient? Yes   Is this medication effective? Not yet started          Refill Questions - Documented Responses      Flowsheet Row Most Recent Value   Patient Availability and HIPAA Verification    Does patient want to proceed with activity? Yes   HIPAA/medical authority confirmed? Yes   Relationship to patient of person spoken to? Self   Refill Screening Questions    When does the patient need to receive the medication? 10/29/22   Refill Delivery Questions    How will the patient receive the medication? Mail   When does the patient need to receive the medication? 10/29/22   Shipping Address Home   Address in [facility] confirmed and updated if neccessary? Yes   Expected Copay ($) 0   Is the patient able to afford the medication copay? Yes   Payment Method zero copay   Days supply of Refill 28   Supplies needed? Alcohol Swabs   Refill activity completed? Yes   Refill activity plan Refill scheduled   Shipment/Pickup Date: 10/27/22            Objective    He has a past medical history of Cholesterol calculus of gallbladder and High blood pressure.    Tried/failed medications: Humira, triamcinolone    BP Readings from Last 4 Encounters:   No data found for BP     Ht Readings from Last 4 Encounters:   10/17/22 5' 7" (1.702 m)   08/01/22 5' 7" (1.702 m)     Wt Readings from Last 4 Encounters:   10/17/22 115.4 kg (254 lb 4.8 oz)   08/01/22 118.5 kg (261 lb 2.2 oz)   01/10/22 122.6 kg (270 lb 2.8 oz)     No results for input(s): CREATININE, ALT, AST, HEPBSAG, HEPBSAB, HEPBCAB in the last 2160 hours.  The goals of Psoriasis treatment include:  Reducing the size, thickness and extent " of lesions and erythema  Relieving the symptoms of psoriasis  Improving and maintaining physical function  Preventing infection and other complications of treatment  Reducing long term complications of psoriasis  Minimizing psychological impact on overall well-being  Improving or maintaining quality of life  Maintaining optimal therapy adherence  Minimizing and managing side effects    Goals of Therapy Status: Discussed (new start)    Assessment/Plan  Patient plans to start therapy on 10/29/22      Indication, dosage, appropriateness, effectiveness, safety and convenience of his specialty medication(s) were reviewed today.     Patient Education   Patient received education on the following:   Expectations and possible outcomes of therapy  Proper use, timely administration, and missed dose management  Duration of therapy  Side effects, including prevention, minimization, and management  Contraindications and safety precautions  New or changed medications, including prescribe and over the counter medications and supplements  Reviews recommended vaccinations, as appropriate  Storage, safe handling, and disposal    Initial consult for Taltz was completed on the phone with patient. Reviewed dosage, proper injection technique, storage, and disposal. Reviewed common and severe side effects. Emphasized upon infection precautions and counseled to hold Taltz in the setting of an active infection. Also advised to avoid live vaccines. Reviewed patient's allergies and medications. No clinically significant DDIs were identified. Reviewed patient's labs: TB and Hep B negative.Therapy appropriate to initiate. Patient is existing to [facility] and is familiar with [facility]'s services and refill process. Patient verbalized understanding of all information reviewed and had no further questions. Patient was encouraged to reach out to [facility] should any questions arise.       Tasks added this encounter   11/19/2022 - Refill Call (Auto Added)  7/26/2023  - Clinical - Follow Up Assesement (Annual)   Tasks due within next 3 months   No tasks due.     Angel Rosas, PharmD  [facility]  [address]  [facility] [address]  Phone: [phone]  Fax: [phone]

## 2022-10-26 NOTE — TELEPHONE ENCOUNTER
Taltz approved through 10/25/2023. Reference #: [id number].     Medicare plan; co-pay $0. BI/FA not required. Forwarding to initial consult.

## 2022-11-21 ENCOUNTER — SPECIALTY PHARMACY (OUTPATIENT)
Dept: PHARMACY | Facility: CLINIC | Age: 67
End: 2022-11-21

## 2022-11-21 NOTE — TELEPHONE ENCOUNTER
Specialty Pharmacy - Refill Coordination    Specialty Medication Orders Linked to Encounter      Flowsheet Row Most Recent Value   Medication #1 ixekizumab (TALTZ AUTOINJECTOR, 2 PACK,) 80 mg/mL AtIn (Order#[id number], Rx#[id number])            Refill Questions - Documented Responses      Flowsheet Row Most Recent Value   Patient Availability and HIPAA Verification    Does patient want to proceed with activity? Yes   HIPAA/medical authority confirmed? Yes   Relationship to patient of person spoken to? Self   Refill Screening Questions    Changes to allergies? No   Changes to medications? No   New conditions since last clinic visit? No   Unplanned office visit, urgent care, ED, or hospital admission in the last 4 weeks? No   How does patient/caregiver feel medication is working? Very good   Financial problems or insurance changes? No   How many doses of your specialty medications were missed in the last 4 weeks? 0   Would patient like to speak to a pharmacist? No   When does the patient need to receive the medication? 11/26/22   Refill Delivery Questions    How will the patient receive the medication? Mail   When does the patient need to receive the medication? 11/26/22   Shipping Address Home   Address in [facility] confirmed and updated if neccessary? Yes   Expected Copay ($) 0   Is the patient able to afford the medication copay? Yes   Payment Method zero copay   Days supply of Refill 28   Supplies needed? Alcohol Swabs   Refill activity completed? Yes   Refill activity plan Refill scheduled   Shipment/Pickup Date: 11/22/22            Current Outpatient Medications   Medication Sig    bisoprolol-hydrochlorothiazide 5-6.25 mg (ZIAC) 5-6.25 mg Tab Take 1 tablet by mouth once daily.    diltiaZEM (CARDIZEM CD) 300 MG 24 hr capsule Take 300 mg by mouth once daily.    glimepiride (AMARYL) 2 MG tablet Take 2 mg by mouth before breakfast.    indomethacin (INDOCIN) 50 MG capsule Take 50 mg by mouth 2 (two) times  daily with meals.    ixekizumab (TALTZ AUTOINJECTOR) 80 mg/mL AtIn Inject 1 pen sc every 4 weeks    ixekizumab (TALTZ AUTOINJECTOR, 2 PACK,) 80 mg/mL AtIn Inject 80 mg (1 pen) into the skin every other week from week 4 through 10    ixekizumab (TALTZ AUTOINJECTOR, 3 PACK,) 80 mg/mL AtIn Inject 160 mg (2 pens) into the skin on week 0, then inject 80 mg (1 pen) into the skin on week 2    lisinopriL (PRINIVIL,ZESTRIL) 2.5 MG tablet Take 2.5 mg by mouth once daily.    metFORMIN (GLUCOPHAGE) 500 MG tablet Take 500 mg by mouth 2 (two) times daily with meals.    montelukast (SINGULAIR) 10 mg tablet Take 10 mg by mouth once daily.    rosuvastatin (CRESTOR) 10 MG tablet Take 10 mg by mouth once daily.    triamcinolone acetonide 0.1% (KENALOG) 0.1 % cream aaa bid-tid x 2-3 wks, tk 1 wk off before repeating   Last reviewed on 10/17/2022 10:42 AM by Zoraida Velez MA    Review of patient's allergies indicates:  No Known Allergies Last reviewed on  10/26/2022 10:13 AM by Angel Rosas      Tasks added this encounter   12/17/2022 - Refill Call (Auto Added)   Tasks due within next 3 months   No tasks due.     Angel Rosas, PharmD  [facility]  [address] [place] [address]  Phone: [phone]  Fax: [phone]

## 2022-12-19 ENCOUNTER — SPECIALTY PHARMACY (OUTPATIENT)
Dept: PHARMACY | Facility: CLINIC | Age: 67
End: 2022-12-19

## 2022-12-19 NOTE — TELEPHONE ENCOUNTER
Specialty Pharmacy - Refill Coordination    Specialty Medication Orders Linked to Encounter      Flowsheet Row Most Recent Value   Medication #1 ixekizumab (TALTZ AUTOINJECTOR, 2 PACK,) 80 mg/mL AtIn (Order#[id number], Rx#[id number])            Refill Questions - Documented Responses      Flowsheet Row Most Recent Value   Patient Availability and HIPAA Verification    Does patient want to proceed with activity? Yes   HIPAA/medical authority confirmed? Yes   Relationship to patient of person spoken to? Self   Refill Screening Questions    Changes to allergies? No   Changes to medications? No   New conditions since last clinic visit? No   Unplanned office visit, urgent care, ED, or hospital admission in the last 4 weeks? No   How does patient/caregiver feel medication is working? Very good   Financial problems or insurance changes? No   How many doses of your specialty medications were missed in the last 4 weeks? 0   Would patient like to speak to a pharmacist? No   When does the patient need to receive the medication? 12/24/22   Refill Delivery Questions    How will the patient receive the medication? Mail   When does the patient need to receive the medication? 12/24/22   Shipping Address Home   Address in [facility] confirmed and updated if neccessary? Yes   Expected Copay ($) 0   Is the patient able to afford the medication copay? Yes   Payment Method zero copay   Days supply of Refill 28   Supplies needed? Alcohol Swabs   Refill activity completed? Yes   Refill activity plan Refill scheduled   Shipment/Pickup Date: 12/21/22            Current Outpatient Medications   Medication Sig    bisoprolol-hydrochlorothiazide 5-6.25 mg (ZIAC) 5-6.25 mg Tab Take 1 tablet by mouth once daily.    diltiaZEM (CARDIZEM CD) 300 MG 24 hr capsule Take 300 mg by mouth once daily.    glimepiride (AMARYL) 2 MG tablet Take 2 mg by mouth before breakfast.    indomethacin (INDOCIN) 50 MG capsule Take 50 mg by mouth 2 (two) times  daily with meals.    ixekizumab (TALTZ AUTOINJECTOR) 80 mg/mL AtIn Inject 1 pen sc every 4 weeks    ixekizumab (TALTZ AUTOINJECTOR, 2 PACK,) 80 mg/mL AtIn Inject 80 mg (1 pen) into the skin every other week from week 4 through 10    ixekizumab (TALTZ AUTOINJECTOR, 3 PACK,) 80 mg/mL AtIn Inject 160 mg (2 pens) into the skin on week 0, then inject 80 mg (1 pen) into the skin on week 2    lisinopriL (PRINIVIL,ZESTRIL) 2.5 MG tablet Take 2.5 mg by mouth once daily.    metFORMIN (GLUCOPHAGE) 500 MG tablet Take 500 mg by mouth 2 (two) times daily with meals.    montelukast (SINGULAIR) 10 mg tablet Take 10 mg by mouth once daily.    rosuvastatin (CRESTOR) 10 MG tablet Take 10 mg by mouth once daily.    triamcinolone acetonide 0.1% (KENALOG) 0.1 % cream aaa bid-tid x 2-3 wks, tk 1 wk off before repeating   Last reviewed on 10/17/2022 10:42 AM by Zoraida Velez MA    Review of patient's allergies indicates:  No Known Allergies Last reviewed on  10/26/2022 10:13 AM by Angel Rosas      Tasks added this encounter   1/14/2023 - Refill Call (Auto Added)   Tasks due within next 3 months   No tasks due.     Angel Rosas, PharmD  [facility]  [address] [place] [address]  Phone: [phone]  Fax: [phone]

## 2023-01-17 ENCOUNTER — SPECIALTY PHARMACY (OUTPATIENT)
Dept: PHARMACY | Facility: CLINIC | Age: 68
End: 2023-01-17

## 2023-01-17 NOTE — TELEPHONE ENCOUNTER
Specialty Pharmacy - Refill Coordination    Specialty Medication Orders Linked to Encounter      Flowsheet Row Most Recent Value   Medication #1 ixekizumab (TALTZ AUTOINJECTOR) 80 mg/mL AtIn (Order#[id number], Rx#[id number])            Refill Questions - Documented Responses      Flowsheet Row Most Recent Value   Patient Availability and HIPAA Verification    Does patient want to proceed with activity? Yes   HIPAA/medical authority confirmed? Yes   Relationship to patient of person spoken to? Self   Refill Screening Questions    Changes to allergies? No   Changes to medications? No   New conditions since last clinic visit? No   Unplanned office visit, urgent care, ED, or hospital admission in the last 4 weeks? No   How does patient/caregiver feel medication is working? Excellent   Financial problems or insurance changes? No   How many doses of your specialty medications were missed in the last 4 weeks? 0   Would patient like to speak to a pharmacist? No   When does the patient need to receive the medication? 01/21/23   Refill Delivery Questions    How will the patient receive the medication? Mail   When does the patient need to receive the medication? 01/21/23   Shipping Address Home   Address in [facility] confirmed and updated if neccessary? Yes   Expected Copay ($) 0   Is the patient able to afford the medication copay? Yes   Payment Method zero copay   Days supply of Refill 28   Supplies needed? Alcohol Swabs   Refill activity completed? Yes   Refill activity plan Refill scheduled   Shipment/Pickup Date: 01/19/23            Current Outpatient Medications   Medication Sig    bisoprolol-hydrochlorothiazide 5-6.25 mg (ZIAC) 5-6.25 mg Tab Take 1 tablet by mouth once daily.    diltiaZEM (CARDIZEM CD) 300 MG 24 hr capsule Take 300 mg by mouth once daily.    glimepiride (AMARYL) 2 MG tablet Take 2 mg by mouth before breakfast.    indomethacin (INDOCIN) 50 MG capsule Take 50 mg by mouth 2 (two) times daily with  meals.    ixekizumab (TALTZ AUTOINJECTOR) 80 mg/mL AtIn Inject 1 pen into the skin every 4 weeks    ixekizumab (TALTZ AUTOINJECTOR, 2 PACK,) 80 mg/mL AtIn Inject 80 mg (1 pen) into the skin every other week from week 4 through 10    ixekizumab (TALTZ AUTOINJECTOR, 3 PACK,) 80 mg/mL AtIn Inject 160 mg (2 pens) into the skin on week 0, then inject 80 mg (1 pen) into the skin on week 2    lisinopriL (PRINIVIL,ZESTRIL) 2.5 MG tablet Take 2.5 mg by mouth once daily.    metFORMIN (GLUCOPHAGE) 500 MG tablet Take 500 mg by mouth 2 (two) times daily with meals.    montelukast (SINGULAIR) 10 mg tablet Take 10 mg by mouth once daily.    rosuvastatin (CRESTOR) 10 MG tablet Take 10 mg by mouth once daily.    triamcinolone acetonide 0.1% (KENALOG) 0.1 % cream aaa bid-tid x 2-3 wks, tk 1 wk off before repeating   Last reviewed on 1/9/2023 10:28 AM by Zoraida Velez MA    Review of patient's allergies indicates:  No Known Allergies Last reviewed on  1/9/2023 10:27 AM by Zoraida Velez      Tasks added this encounter   2/11/2023 - Refill Call (Auto Added)   Tasks due within next 3 months   No tasks due.     Angel Rosas, PharmD  [facility]  [address] [place] [address]  Phone: [phone]  Fax: [phone]

## 2023-02-01 ENCOUNTER — SPECIALTY PHARMACY (OUTPATIENT)
Dept: PHARMACY | Facility: CLINIC | Age: 68
End: 2023-02-01

## 2023-02-01 NOTE — TELEPHONE ENCOUNTER
Message received from [employer] stating that patient has new insurance. New medicare plan effective starting today. Taltz is covered under transition benefit. PA required; submitted via epic.

## 2023-02-07 NOTE — TELEPHONE ENCOUNTER
Incoming call from patient inquiring about PA status through new insurance. Assigned pharmacist stated the PA was denied and an appeal is being worked on. Notified patient, he verbalized understanding. No further questions/concerns. Patient stated his dose is due on 2/11.

## 2023-02-08 NOTE — TELEPHONE ENCOUNTER
Taltz PA denied due to unmet clinical criteria. Messaged provider to complete most recent office visit.    Appeal faxed to plan today with updated chart notes.

## 2023-03-13 ENCOUNTER — SPECIALTY PHARMACY (OUTPATIENT)
Dept: PHARMACY | Facility: CLINIC | Age: 68
End: 2023-03-13

## 2023-03-13 NOTE — TELEPHONE ENCOUNTER
Specialty Pharmacy - Refill Coordination    Specialty Medication Orders Linked to Encounter      Flowsheet Row Most Recent Value   Medication #1 ixekizumab (TALTZ AUTOINJECTOR) 80 mg/mL AtIn (Order#[id number], Rx#[id number])            Refill Questions - Documented Responses      Flowsheet Row Most Recent Value   Patient Availability and HIPAA Verification    Does patient want to proceed with activity? Yes   HIPAA/medical authority confirmed? Yes   Relationship to patient of person spoken to? Self   Refill Screening Questions    Changes to allergies? No   Changes to medications? No   New conditions since last clinic visit? No   Unplanned office visit, urgent care, ED, or hospital admission in the last 4 weeks? No   How does patient/caregiver feel medication is working? Good   Financial problems or insurance changes? No   How many doses of your specialty medications were missed in the last 4 weeks? 0   Would patient like to speak to a pharmacist? No   When does the patient need to receive the medication? 03/18/23   Refill Delivery Questions    How will the patient receive the medication? Mail   When does the patient need to receive the medication? 03/18/23   Shipping Address Home   Address in [facility] confirmed and updated if neccessary? Yes   Expected Copay ($) 0   Is the patient able to afford the medication copay? Yes   Payment Method zero copay   Days supply of Refill 28   Supplies needed? No supplies needed   Refill activity completed? Yes   Refill activity plan Refill scheduled   Shipment/Pickup Date: 03/14/23            Current Outpatient Medications   Medication Sig    bisoprolol-hydrochlorothiazide 5-6.25 mg (ZIAC) 5-6.25 mg Tab Take 1 tablet by mouth once daily.    diltiaZEM (CARDIZEM CD) 300 MG 24 hr capsule Take 300 mg by mouth once daily.    glimepiride (AMARYL) 2 MG tablet Take 2 mg by mouth before breakfast.    indomethacin (INDOCIN) 50 MG capsule Take 50 mg by mouth 2 (two) times daily with  meals.    ixekizumab (TALTZ AUTOINJECTOR) 80 mg/mL AtIn Inject 1 pen into the skin every 4 weeks    lisinopriL (PRINIVIL,ZESTRIL) 2.5 MG tablet Take 2.5 mg by mouth once daily.    metFORMIN (GLUCOPHAGE) 500 MG tablet Take 500 mg by mouth 2 (two) times daily with meals.    montelukast (SINGULAIR) 10 mg tablet Take 10 mg by mouth once daily.    rosuvastatin (CRESTOR) 10 MG tablet Take 10 mg by mouth once daily.    triamcinolone acetonide 0.1% (KENALOG) 0.1 % cream aaa bid-tid x 2-3 wks, tk 1 wk off before repeating   Last reviewed on 1/9/2023 10:28 AM by Zoraida Velez MA    Review of patient's allergies indicates:  No Known Allergies Last reviewed on  1/9/2023 10:27 AM by Zoraida Velez      Tasks added this encounter   4/8/2023 - Refill Call (Auto Added)   Tasks due within next 3 months   No tasks due.     Kaila Sinha Pending [facility]  [address] [facility] [address]  Phone: [phone]  Fax: [phone]

## 2023-04-10 ENCOUNTER — SPECIALTY PHARMACY (OUTPATIENT)
Dept: PHARMACY | Facility: CLINIC | Age: 68
End: 2023-04-10

## 2023-04-10 NOTE — TELEPHONE ENCOUNTER
Specialty Pharmacy - Refill Coordination    Specialty Medication Orders Linked to Encounter      Flowsheet Row Most Recent Value   Medication #1 ixekizumab (TALTZ AUTOINJECTOR) 80 mg/mL AtIn (Order#[id number], Rx#[id number])            Refill Questions - Documented Responses      Flowsheet Row Most Recent Value   Patient Availability and HIPAA Verification    Does patient want to proceed with activity? Yes   HIPAA/medical authority confirmed? Yes   Relationship to patient of person spoken to? Self   Refill Screening Questions    Changes to allergies? No   Changes to medications? No   New conditions since last clinic visit? No   Unplanned office visit, urgent care, ED, or hospital admission in the last 4 weeks? No   How does patient/caregiver feel medication is working? Good   Financial problems or insurance changes? No   How many doses of your specialty medications were missed in the last 4 weeks? 0   Would patient like to speak to a pharmacist? No   When does the patient need to receive the medication? 04/15/23   Refill Delivery Questions    How will the patient receive the medication? Mail   When does the patient need to receive the medication? 04/15/23   Shipping Address Home   Address in [facility] confirmed and updated if neccessary? Yes   Expected Copay ($) 0   Is the patient able to afford the medication copay? Yes   Payment Method zero copay   Days supply of Refill 28   Supplies needed? No supplies needed   Refill activity completed? Yes   Refill activity plan Refill scheduled   Shipment/Pickup Date: 04/11/23            Current Outpatient Medications   Medication Sig    bisoprolol-hydrochlorothiazide 5-6.25 mg (ZIAC) 5-6.25 mg Tab Take 1 tablet by mouth once daily.    diltiaZEM (CARDIZEM CD) 300 MG 24 hr capsule Take 300 mg by mouth once daily.    glimepiride (AMARYL) 2 MG tablet Take 2 mg by mouth before breakfast.    indomethacin (INDOCIN) 50 MG capsule Take 50 mg by mouth 2 (two) times daily with  meals.    ixekizumab (TALTZ AUTOINJECTOR) 80 mg/mL AtIn Inject 1 pen into the skin every 4 weeks    lisinopriL (PRINIVIL,ZESTRIL) 2.5 MG tablet Take 2.5 mg by mouth once daily.    metFORMIN (GLUCOPHAGE) 500 MG tablet Take 500 mg by mouth 2 (two) times daily with meals.    montelukast (SINGULAIR) 10 mg tablet Take 10 mg by mouth once daily.    rosuvastatin (CRESTOR) 10 MG tablet Take 10 mg by mouth once daily.    triamcinolone acetonide 0.1% (KENALOG) 0.1 % cream aaa bid-tid x 2-3 wks, tk 1 wk off before repeating   Last reviewed on 1/9/2023 10:28 AM by Zoraida Velez MA    Review of patient's allergies indicates:  No Known Allergies Last reviewed on  1/9/2023 10:27 AM by Zoraida Velez      Tasks added this encounter   5/6/2023 - Refill Call (Auto Added)   Tasks due within next 3 months   No tasks due.     [facility]  [address] [facility] [address]  Phone: [phone]  Fax: [phone]

## 2023-05-08 ENCOUNTER — SPECIALTY PHARMACY (OUTPATIENT)
Dept: PHARMACY | Facility: CLINIC | Age: 68
End: 2023-05-08

## 2023-05-08 NOTE — TELEPHONE ENCOUNTER
Specialty Pharmacy - Refill Coordination    Specialty Medication Orders Linked to Encounter      Flowsheet Row Most Recent Value   Medication #1 ixekizumab (TALTZ AUTOINJECTOR) 80 mg/mL AtIn (Order#[id number], Rx#[id number])            Refill Questions - Documented Responses      Flowsheet Row Most Recent Value   Refill Screening Questions    Changes to allergies? No   Changes to medications? No   New conditions since last clinic visit? No   Unplanned office visit, urgent care, ED, or hospital admission in the last 4 weeks? No   How does patient/caregiver feel medication is working? Very good   Financial problems or insurance changes? No   How many doses of your specialty medications were missed in the last 4 weeks? 0   Would patient like to speak to a pharmacist? No   When does the patient need to receive the medication? 05/13/23   Refill Delivery Questions    How will the patient receive the medication? Mail   When does the patient need to receive the medication? 05/13/23   Shipping Address Home   Address in [facility] confirmed and updated if neccessary? Yes   Expected Copay ($) 0   Is the patient able to afford the medication copay? Yes   Payment Method zero copay   Days supply of Refill 28   Supplies needed? No supplies needed   Refill activity completed? Yes   Refill activity plan Refill scheduled   Shipment/Pickup Date: 05/10/23            Current Outpatient Medications   Medication Sig    bisoprolol-hydrochlorothiazide 5-6.25 mg (ZIAC) 5-6.25 mg Tab Take 1 tablet by mouth once daily.    diltiaZEM (CARDIZEM CD) 300 MG 24 hr capsule Take 300 mg by mouth once daily.    glimepiride (AMARYL) 2 MG tablet Take 2 mg by mouth before breakfast.    indomethacin (INDOCIN) 50 MG capsule Take 50 mg by mouth 2 (two) times daily with meals.    ixekizumab (TALTZ AUTOINJECTOR) 80 mg/mL AtIn Inject 1 pen into the skin every 4 weeks    lisinopriL (PRINIVIL,ZESTRIL) 2.5 MG tablet Take 2.5 mg by mouth once daily.     metFORMIN (GLUCOPHAGE) 500 MG tablet Take 500 mg by mouth 2 (two) times daily with meals.    montelukast (SINGULAIR) 10 mg tablet Take 10 mg by mouth once daily.    rosuvastatin (CRESTOR) 10 MG tablet Take 10 mg by mouth once daily.    triamcinolone acetonide 0.1% (KENALOG) 0.1 % cream aaa bid-tid x 2-3 wks, tk 1 wk off before repeating   Last reviewed on 1/9/2023 10:28 AM by Zoraida Velez MA    Review of patient's allergies indicates:  No Known Allergies Last reviewed on  1/9/2023 10:27 AM by Zoraida Velez      Tasks added this encounter   No tasks added.   Tasks due within next 3 months   7/26/2023 - Clinical Assessment (1 year recurrence)  5/11/2023 - Refill Coordination Outreach (1 time occurrence)     Tej, PharmD  [facility]  [facility] [address]  [facility] [address]  Phone: [phone]  Fax: [phone]

## 2023-05-31 ENCOUNTER — SPECIALTY PHARMACY (OUTPATIENT)
Dept: PHARMACY | Facility: CLINIC | Age: 68
End: 2023-05-31

## 2023-05-31 NOTE — TELEPHONE ENCOUNTER
Specialty Pharmacy - Refill Coordination    Specialty Medication Orders Linked to Encounter      Flowsheet Row Most Recent Value   Medication #1 ixekizumab (TALTZ AUTOINJECTOR) 80 mg/mL AtIn (Order#[id number], Rx#[id number])            Refill Questions - Documented Responses      Flowsheet Row Most Recent Value   Patient Availability and HIPAA Verification    Does patient want to proceed with activity? Yes   HIPAA/medical authority confirmed? Yes   Relationship to patient of person spoken to? Self   Refill Screening Questions    Changes to allergies? No   Changes to medications? No   New conditions since last clinic visit? No   Unplanned office visit, urgent care, ED, or hospital admission in the last 4 weeks? No   How does patient/caregiver feel medication is working? Good   Financial problems or insurance changes? No   How many doses of your specialty medications were missed in the last 4 weeks? 0   Would patient like to speak to a pharmacist? No   When does the patient need to receive the medication? 06/09/23   Refill Delivery Questions    How will the patient receive the medication? Mail   When does the patient need to receive the medication? 06/09/23   Shipping Address Home   Address in [facility] confirmed and updated if neccessary? Yes   Expected Copay ($) 0   Is the patient able to afford the medication copay? Yes   Payment Method zero copay   Days supply of Refill 28   Supplies needed? No supplies needed   Refill activity completed? Yes   Refill activity plan Refill scheduled   Shipment/Pickup Date: 06/06/23            Current Outpatient Medications   Medication Sig    bisoprolol-hydrochlorothiazide 5-6.25 mg (ZIAC) 5-6.25 mg Tab Take 1 tablet by mouth once daily.    diltiaZEM (CARDIZEM CD) 300 MG 24 hr capsule Take 300 mg by mouth once daily.    glimepiride (AMARYL) 2 MG tablet Take 2 mg by mouth before breakfast.    indomethacin (INDOCIN) 50 MG capsule Take 50 mg by mouth 2 (two) times daily with  meals.    ixekizumab (TALTZ AUTOINJECTOR) 80 mg/mL AtIn Inject 1 pen into the skin every 4 weeks    lisinopriL (PRINIVIL,ZESTRIL) 2.5 MG tablet Take 2.5 mg by mouth once daily.    metFORMIN (GLUCOPHAGE) 500 MG tablet Take 500 mg by mouth 2 (two) times daily with meals.    montelukast (SINGULAIR) 10 mg tablet Take 10 mg by mouth once daily.    rosuvastatin (CRESTOR) 10 MG tablet Take 10 mg by mouth once daily.    triamcinolone acetonide 0.1% (KENALOG) 0.1 % cream aaa bid-tid x 2-3 wks, tk 1 wk off before repeating   Last reviewed on 1/9/2023 10:28 AM by Zoraida Velez MA    Review of patient's allergies indicates:  No Known Allergies Last reviewed on  1/9/2023 10:27 AM by Zoraida Velez      Tasks added this encounter   No tasks added.   Tasks due within next 3 months   7/26/2023 - Clinical Assessment (1 year recurrence)  5/31/2023 - Refill Coordination Outreach (1 time occurrence)     [facility]  [address] [place] [address]  Phone: [phone]  Fax: [phone]

## 2023-06-27 ENCOUNTER — SPECIALTY PHARMACY (OUTPATIENT)
Dept: PHARMACY | Facility: CLINIC | Age: 68
End: 2023-06-27

## 2023-06-27 NOTE — TELEPHONE ENCOUNTER
Specialty Pharmacy - Refill Coordination    Specialty Medication Orders Linked to Encounter      Flowsheet Row Most Recent Value   Medication #1 ixekizumab (TALTZ AUTOINJECTOR) 80 mg/mL AtIn (Order#[id number], Rx#[id number])          Shipping date is being changed from 7/3 to 7/5 due to holiday. A call was made to the pt to authorize change, but there was no answer.  Refill Questions - Documented Responses      Flowsheet Row Most Recent Value   Patient Availability and HIPAA Verification    Does patient want to proceed with activity? Yes   HIPAA/medical authority confirmed? Yes   Relationship to patient of person spoken to? Self   Refill Screening Questions    Changes to allergies? No   Changes to medications? No   New conditions since last clinic visit? No   Unplanned office visit, urgent care, ED, or hospital admission in the last 4 weeks? No   How does patient/caregiver feel medication is working? Good   Financial problems or insurance changes? No   How many doses of your specialty medications were missed in the last 4 weeks? 0   Would patient like to speak to a pharmacist? No   When does the patient need to receive the medication? 07/07/23   Refill Delivery Questions    How will the patient receive the medication? Mail   When does the patient need to receive the medication? 07/07/23   Shipping Address Home   Address in [facility] confirmed and updated if neccessary? Yes   Expected Copay ($) 0   Is the patient able to afford the medication copay? Yes   Payment Method zero copay   Days supply of Refill 28   Supplies needed? No supplies needed   Refill activity completed? Yes   Refill activity plan Refill scheduled   Shipment/Pickup Date: 07/05/23            Current Outpatient Medications   Medication Sig    bisoprolol-hydrochlorothiazide 5-6.25 mg (ZIAC) 5-6.25 mg Tab Take 1 tablet by mouth once daily.    diltiaZEM (CARDIZEM CD) 300 MG 24 hr capsule Take 300 mg by mouth once daily.    glimepiride (AMARYL) 2  MG tablet Take 2 mg by mouth before breakfast.    indomethacin (INDOCIN) 50 MG capsule Take 50 mg by mouth 2 (two) times daily with meals.    ixekizumab (TALTZ AUTOINJECTOR) 80 mg/mL AtIn Inject 1 pen into the skin every 4 weeks    lisinopriL (PRINIVIL,ZESTRIL) 2.5 MG tablet Take 2.5 mg by mouth once daily.    metFORMIN (GLUCOPHAGE) 500 MG tablet Take 500 mg by mouth 2 (two) times daily with meals.    montelukast (SINGULAIR) 10 mg tablet Take 10 mg by mouth once daily.    rosuvastatin (CRESTOR) 10 MG tablet Take 10 mg by mouth once daily.    triamcinolone acetonide 0.1% (KENALOG) 0.1 % cream aaa bid-tid x 2-3 wks, tk 1 wk off before repeating   Last reviewed on 1/9/2023 10:28 AM by Zoraida Velez MA    Review of patient's allergies indicates:  No Known Allergies Last reviewed on  1/9/2023 10:27 AM by Zoraida Velez      Tasks added this encounter   No tasks added.   Tasks due within next 3 months   7/26/2023 - Clinical Assessment (1 year recurrence)     [facility]  [address]  [facility] [address]  Phone: [phone]  Fax: [phone]

## 2023-06-27 NOTE — TELEPHONE ENCOUNTER
Outgoing call to pt regarding Taltz delivery.    Shipping date is being changed from 7/3 to 7/5 due to holiday. A call was made to the pt to authorize change, but there was no answer. Left voicemail for a call back.

## 2023-07-26 ENCOUNTER — SPECIALTY PHARMACY (OUTPATIENT)
Dept: PHARMACY | Facility: CLINIC | Age: 68
End: 2023-07-26

## 2023-07-26 DIAGNOSIS — L40.9 PSORIASIS: Primary | ICD-10-CM

## 2023-07-26 NOTE — TELEPHONE ENCOUNTER
Specialty Pharmacy - Clinical Reassessment    Specialty Medication Orders Linked to Encounter      Flowsheet Row Most Recent Value   Medication #1 ixekizumab (TALTZ AUTOINJECTOR) 80 mg/mL AtIn (Order#[id number], Rx#[id number])          Patient Diagnosis   L40.9 - Psoriasis    Nawaf Correa Jr. is a 68 y.o. male, who is followed by the specialty pharmacy service for management and education of his Taltz.  He has been on therapy with Taltz for 9 months.  I have reviewed his electronic medical record and current medication list and determined that specialty medication adjustment Is not needed at this time.    Patient has not experienced adverse events.  He Is adherent reporting 0 missed doses since last review.  Adherence has been encouraged with the following mechanism(s): proactive refill calls.  He is meeting goals of therapy and will continue treatment.        6/27/2023 5/31/2023 5/8/2023 4/10/2023 3/13/2023 2/13/2023 1/17/2023   Follow Up Review   # of missed doses 0 0 0 0 0 0 0   New Medications? No No No No No No No   New Conditions? No No No No No No No   New Allergies? No No No No No No No   Med Effective? Good Good Very good Good Good Excellent Excellent   Urgent Care? No No No No No No No   Requested Pharmacist? No No No No No No No         Therapy is appropriate to continue.    Therapy is effective: Yes  On scale of 1 to 10, how does patient rank quality of life? (10 - Best): 8  Recommendations: none at this time.  Review Method: Patient Contact    Tasks added this encounter   4/26/2024 - Clinical Assessment (1 year recurrence)   Tasks due within next 3 months   7/26/2023 - Refill Coordination Outreach (1 time occurrence)     Angel Rosas, PharmD  [facility]  [address] [facility] [address]  Phone: [phone]  Fax: [phone]

## 2023-07-26 NOTE — TELEPHONE ENCOUNTER
Specialty Pharmacy - Clinical Reassessment  Specialty Pharmacy - Refill Coordination    Specialty Medication Orders Linked to Encounter      Flowsheet Row Most Recent Value   Medication #1 ixekizumab (TALTZ AUTOINJECTOR) 80 mg/mL AtIn (Order#[id number], Rx#[id number])            Refill Questions - Documented Responses      Flowsheet Row Most Recent Value   Patient Availability and HIPAA Verification    Does patient want to proceed with activity? Yes   HIPAA/medical authority confirmed? Yes   Relationship to patient of person spoken to? Self   Refill Screening Questions    Changes to allergies? No   Changes to medications? No   New conditions since last clinic visit? No   Unplanned office visit, urgent care, ED, or hospital admission in the last 4 weeks? No   How does patient/caregiver feel medication is working? Very good   Financial problems or insurance changes? No   How many doses of your specialty medications were missed in the last 4 weeks? 0   Would patient like to speak to a pharmacist? No   When does the patient need to receive the medication? 08/05/23   Refill Delivery Questions    How will the patient receive the medication? Mail   When does the patient need to receive the medication? 08/05/23   Shipping Address Home   Address in [facility] confirmed and updated if neccessary? Yes   Expected Copay ($) 0   Is the patient able to afford the medication copay? Yes   Payment Method zero copay   Days supply of Refill 28   Supplies needed? Alcohol Swabs   Refill activity completed? Yes   Refill activity plan Refill scheduled   Shipment/Pickup Date: 08/02/23            Current Outpatient Medications   Medication Sig    bisoprolol-hydrochlorothiazide 5-6.25 mg (ZIAC) 5-6.25 mg Tab Take 1 tablet by mouth once daily.    diltiaZEM (CARDIZEM CD) 300 MG 24 hr capsule Take 300 mg by mouth once daily.    glimepiride (AMARYL) 2 MG tablet Take 2 mg by mouth before breakfast.    indomethacin (INDOCIN) 50 MG capsule  Take 50 mg by mouth 2 (two) times daily with meals.    ixekizumab (TALTZ AUTOINJECTOR) 80 mg/mL AtIn Inject 1 pen into the skin every 4 weeks    lisinopriL (PRINIVIL,ZESTRIL) 2.5 MG tablet Take 2.5 mg by mouth once daily.    metFORMIN (GLUCOPHAGE) 500 MG tablet Take 500 mg by mouth 2 (two) times daily with meals.    montelukast (SINGULAIR) 10 mg tablet Take 10 mg by mouth once daily.    rosuvastatin (CRESTOR) 10 MG tablet Take 10 mg by mouth once daily.    triamcinolone acetonide 0.1% (KENALOG) 0.1 % cream aaa bid-tid x 2-3 wks, tk 1 wk off before repeating   Last reviewed on 7/26/2023  1:42 PM by Angel Rosas PharmD    Review of patient's allergies indicates:  No Known Allergies Last reviewed on  7/26/2023 1:41 PM by Angel Rosas      Tasks added this encounter   4/26/2024 - Clinical Assessment (1 year recurrence)   Tasks due within next 3 months   No tasks due.     Angel Rosas, PharmD  [facility]  [address] [place], [place] [address]  [facility] [address]  Phone: [phone]  Fax: [phone]

## 2025-06-04 DIAGNOSIS — K31.89 DUODENAL MASS: Primary | ICD-10-CM

## 2025-06-09 ENCOUNTER — TELEPHONE (OUTPATIENT)
Dept: GASTROENTEROLOGY | Facility: CLINIC | Age: 70
End: 2025-06-09
Payer: MEDICARE

## 2025-06-09 NOTE — TELEPHONE ENCOUNTER
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 Instructed pt to call offices of previous facilities to have them fax additional records to our  fax number. Once requested those additional records, instructed pt to call me to schedule a clinic appt. Pt verbalized understanding.

## 2025-06-10 ENCOUNTER — TELEPHONE (OUTPATIENT)
Dept: GASTROENTEROLOGY | Facility: CLINIC | Age: 70
End: 2025-06-10
Payer: MEDICARE

## 2025-06-10 NOTE — TELEPHONE ENCOUNTER
Spoke to pt to schedule clinic appt for duodenal mass. Appt scheduled with Jeet Cam on 6/24. Pt instructed to bring outside records. Pt verbalized understanding